# Patient Record
Sex: FEMALE | Race: WHITE | NOT HISPANIC OR LATINO
[De-identification: names, ages, dates, MRNs, and addresses within clinical notes are randomized per-mention and may not be internally consistent; named-entity substitution may affect disease eponyms.]

---

## 2022-10-12 ENCOUNTER — APPOINTMENT (OUTPATIENT)
Dept: ORTHOPEDIC SURGERY | Facility: CLINIC | Age: 87
End: 2022-10-12

## 2022-10-12 DIAGNOSIS — I10 ESSENTIAL (PRIMARY) HYPERTENSION: ICD-10-CM

## 2022-10-12 PROBLEM — Z00.00 ENCOUNTER FOR PREVENTIVE HEALTH EXAMINATION: Status: ACTIVE | Noted: 2022-10-12

## 2022-10-12 PROCEDURE — 99203 OFFICE O/P NEW LOW 30 MIN: CPT | Mod: 25

## 2022-10-12 PROCEDURE — 73560 X-RAY EXAM OF KNEE 1 OR 2: CPT | Mod: LT

## 2022-10-12 PROCEDURE — 20610 DRAIN/INJ JOINT/BURSA W/O US: CPT | Mod: LT

## 2022-10-12 NOTE — DISCUSSION/SUMMARY
[de-identified] : \par IMPRESSION:   Left knee pain due to osteoarthritis of the knee.\par \par \par PLAN:  Patient was advised for a cortisone injection for her acute pain, patient agrees.  \par Patient tolerated injection well.  \par Patient was advised to follow up in 4 weeks for repeat evaluation at that time we may discuss viscosupplementation injection if the cortisone injection did not help her.  \par Patient was also advised that cortisone injection can only be given 3 times a year.\par Patient was advised for the use of a knee support, this time she was placed in an Ace bandage.\par \par \par FOLLOW-UP:  Four weeks for repeat evaluation.\par \par \par \par SUPERVISING PHYSICIAN DR. BAILEY.\par

## 2022-10-12 NOTE — IMAGING
[de-identified] :  on examination of the left knee, patient has large knees due to body habitus, mild swelling, no ecchymosis, no erythema, no joint effusion observed.  \par No warmth to palpation.  \par Tenderness over the medial joint.  \par Mild crepitance with flexion-extension of the knee.  \par No signs of instability.  \par Patient is able to do active straight leg raise.  \par Calf is soft, nontender.  \par Patient has some vascular changes on the lower extremity.  \par \par \par X-ray of the left knee was done in office, two views were taking, this x-ray was negative for any acute fracture or dislocation, joint narrowing space over the medial and patellofemoral compartment. ENT

## 2022-10-12 NOTE — PROCEDURE
[Large Joint Injection] : Large joint injection [Left] : of the left [Knee] : knee [Pain] : pain [X-ray evidence of Osteoarthritis on this or prior visit] : x-ray evidence of Osteoarthritis on this or prior visit [Alcohol] : alcohol [Betadine] : betadine [Ethyl Chloride sprayed topically] : ethyl chloride sprayed topically [Sterile technique used] : sterile technique used [___ cc    0.25%] : Bupivacaine (Marcaine) ~Vcc of 0.25%  [___ cc    4mg] : Dexamethasone (Decadron) ~Vcc of 4 mg  [] : Patient tolerated procedure well

## 2022-10-12 NOTE — HISTORY OF PRESENT ILLNESS
[de-identified] :  87-year-old female here for an evaluation of injury sustained to the left knee, patient states that couple days ago she had numbness on her left lower extremity, her leg fell is the and she putting call weight on her left lower extremity so she was crawling for about an hour to get  around her apartment, she states that fine and she was able to standup, since then she is having significant pain her left knee, patient states that the pain is mainly with ambulation, patient states that she has been ambulating with a cane, prior to this incident she was walking with no cane.\par \par   Past medical history hypertension

## 2022-11-04 ENCOUNTER — APPOINTMENT (OUTPATIENT)
Dept: ORTHOPEDIC SURGERY | Facility: CLINIC | Age: 87
End: 2022-11-04

## 2022-11-29 ENCOUNTER — APPOINTMENT (OUTPATIENT)
Dept: ORTHOPEDIC SURGERY | Facility: CLINIC | Age: 87
End: 2022-11-29

## 2022-11-29 PROCEDURE — 99214 OFFICE O/P EST MOD 30 MIN: CPT

## 2022-11-29 RX ORDER — HYLAN G-F 20 16MG/2ML
48 SYRINGE (ML) INTRAARTICULAR
Qty: 1 | Refills: 0 | Status: ACTIVE | OUTPATIENT
Start: 2022-11-29

## 2022-11-29 NOTE — HISTORY OF PRESENT ILLNESS
[de-identified] : 87-year-old female status post injury sustained to the left knee on about giving October, patient fell down injured her knee, she was seen in our office on October 12 at that time she was advised of osteoarthritis and contusion, patient was advised to have a cortisone injection and use protective weight-bearing, patient states that injection did not last long.  \par X-ray of the left knee were done in previous visit and noted that she has moderate to severe arthritis\par \par  patient states that the pain is mainly when she get up from a sitting position.  \par Patient states that she responded well to ibuprofen.

## 2022-11-29 NOTE — DISCUSSION/SUMMARY
[de-identified] : IMPRESSION: Left knee osteoarthritis\par \par PLAN:Synvisc One to left knee\par \par FOLLOW-UP: 6 weeks\par \par SUPERVISING PHYSICIAN DR. BAILEY\par

## 2022-11-29 NOTE — IMAGING
[de-identified] :  on examination of the left knee, patient has large knees due to body habitus, mild swelling, no ecchymosis, no erythema, no joint effusion observed.  \par No warmth to palpation.  \par Tenderness over the medial joint.  \par Mild crepitance with flexion-extension of the knee.  \par No signs of instability.  \par Patient is able to do active straight leg raise.  \par Calf is soft, nontender.  \par Patient has some vascular changes on the lower extremity.  \par \par \par

## 2023-01-10 ENCOUNTER — APPOINTMENT (OUTPATIENT)
Dept: ORTHOPEDIC SURGERY | Facility: CLINIC | Age: 88
End: 2023-01-10
Payer: MEDICARE

## 2023-01-10 PROCEDURE — 20610 DRAIN/INJ JOINT/BURSA W/O US: CPT | Mod: LT

## 2023-01-10 NOTE — DISCUSSION/SUMMARY
[de-identified] : Impression: Left knee osteoarthritis\par \par Plan: Synvisc 1 injection was given to the left knee, patient tolerated well, patient was advised that this medication may take 6 weeks for this medication to works.\par Patient was advised to give me a call to give an update on how she is doing with this medication.\par Since the patient failed 2 cortisone injection, in case of she were to fail to viscosupplementation injection we can try Zilretta injection.\par \par Follow-up: 6 months to a year\par \par Supervising physician Dr. Charles

## 2023-01-10 NOTE — HISTORY OF PRESENT ILLNESS
[de-identified] : 87-year female here for Synvisc 1 injection to the left knee, patient states that the cortisone injection did not help her pain, and she is grateful that this medication will help her.\par Patient is also suffering with peripheral vascular disease.  Patient had a recent appointment with her vascular doctor.

## 2023-01-10 NOTE — IMAGING
[de-identified] :  on examination of the left knee, patient has large knees due to body habitus, mild swelling, no ecchymosis, no erythema, no joint effusion observed.  \par No warmth to palpation.  \par Tenderness over the medial joint.  \par Mild crepitance with flexion-extension of the knee.  \par No signs of instability.  \par Patient is able to do active straight leg raise.  \par Calf is soft, nontender.  \par Patient has some vascular changes on the lower extremity.  \par Patient has some blanching erythema over the anterior aspect of the leg, nontender to palpation, no signs of infection\par \par \par

## 2023-06-16 ENCOUNTER — APPOINTMENT (OUTPATIENT)
Dept: ORTHOPEDIC SURGERY | Facility: CLINIC | Age: 88
End: 2023-06-16
Payer: MEDICARE

## 2023-06-16 ENCOUNTER — APPOINTMENT (OUTPATIENT)
Dept: ORTHOPEDIC SURGERY | Facility: CLINIC | Age: 88
End: 2023-06-16

## 2023-06-16 PROCEDURE — 99213 OFFICE O/P EST LOW 20 MIN: CPT

## 2023-06-16 NOTE — HISTORY OF PRESENT ILLNESS
[de-identified] : 87-year-old lady with left knee pain.\par \par No improvement with cortisone or viscosupplementation injections\par \par Considering surgery but at this point I do not think she would be a good candidate for the operation.  She is somewhat debilitated, she has pedal edema and peripheral vascular disease in both legs.\par And that she would have a difficult time with the rehab and be high risk for the operation.\par \par X-rays are reviewed, she had moderate arthritic changes in the left knee\par \par She may be a better candidate for pain management, nonoperative management of her symptoms with possible nerve ablation.\par \par She can follow-up with me as needed.\par \par

## 2023-06-29 ENCOUNTER — APPOINTMENT (OUTPATIENT)
Dept: PAIN MANAGEMENT | Facility: CLINIC | Age: 88
End: 2023-06-29
Payer: MEDICARE

## 2023-06-29 VITALS — WEIGHT: 170 LBS | HEIGHT: 64 IN | BODY MASS INDEX: 29.02 KG/M2

## 2023-06-29 DIAGNOSIS — Z87.39 PERSONAL HISTORY OF OTHER DISEASES OF THE MUSCULOSKELETAL SYSTEM AND CONNECTIVE TISSUE: ICD-10-CM

## 2023-06-29 DIAGNOSIS — Z86.79 PERSONAL HISTORY OF OTHER DISEASES OF THE CIRCULATORY SYSTEM: ICD-10-CM

## 2023-06-29 PROCEDURE — 99203 OFFICE O/P NEW LOW 30 MIN: CPT

## 2023-06-30 NOTE — DATA REVIEWED
[FreeTextEntry1] : X-ray of the left knee reviewed \par Mild-to-moderate osteoarthritis at the medial joint compartment

## 2023-06-30 NOTE — HISTORY OF PRESENT ILLNESS
[FreeTextEntry1] : HISTORY OF PRESENT ILLNESS: Mrs. Abernathy is an 87 year old female complaining of knee pain. The pain started one year ago after an undermine event. The patients left knee pain; the pain is located medial joint. The patient admits of knees buckle. The pain is worse when walking and needs to use cane for support. She reports 6-7 months ago she had a fall. Patient describes the pain as moderate to severe.  During the last month the pain has been intermittent with symptoms worsening in no typical pattern. Pain described as sharp. Pain is increased with standing and walking.  Pain is decreased with lying down, sitting. Bowel or bladder habits have not changed.\par \par ACTIVITIES: Patient can sit 10 hours before pain starts.  Patient can stand 15 minutes before pain starts.  The patient never lies down because of pain.  Patient uses cane at this time.  Patient has difficulty performing household chores, going to work, doing yard work or shopping, participating in recreational activities or exercise at this time.\par Prior Pain Medications: liquid Motrin \par

## 2023-06-30 NOTE — PHYSICAL EXAM
[de-identified] : Inspection/palpation\par Negative swelling, erythema, warmth\par minimal tenderness to palpation at medial joint line \par Negative crepitus\par \par ROM:\par Flexion 115 (normal 120-150)\par Extension 0 (normal 0-15)\par \par Tests: Negative anterior drawer test\par Negative Prasad's test\par There is no varus or valgus instability\par Quad strength is 5/5, hamstring strength is 5/5\par

## 2023-06-30 NOTE — DISCUSSION/SUMMARY
[de-identified] : A discussion regarding available pain management treatment options occurred with the patient.  These included interventional, rehabilitative, pharmacological, and alternative modalities. We will proceed with the following:\par \par Interventional treatment options:\par - Proceed with left genicular nerve block given a poor response to intra-articular steroid and hyaluronic acid injections.  The pain is still persistent and has failed conservative management otherwise.\par \par Rehabilitative options:\par - participation in active HEP was discussed\par \par follow up \par \par ANNMARIE Nguyen attest that this documentation has been prepared under the direction and in the presence of provider Dr. Sebas Nguyen. \par The documentation recorded by the scribe in my presence, accurately reflects the service I personally performed, and the decisions made by me with my edits as appropriate. \par \par Sebas Nguyen, \par \par

## 2023-07-17 ENCOUNTER — APPOINTMENT (OUTPATIENT)
Dept: PAIN MANAGEMENT | Facility: CLINIC | Age: 88
End: 2023-07-17
Payer: MEDICARE

## 2023-07-17 PROCEDURE — 64454 NJX AA&/STRD GNCLR NRV BRNCH: CPT | Mod: LT

## 2023-07-18 NOTE — PROCEDURE
[FreeTextEntry3] : Date of Service: 07/17/2023 \par \par Account: [id number]\par \par Patient: HETAL GOFF \par \par YOB: 1935\par \par Age: 87 year\par \par Surgeon:  Sebas Nguyen DO\par \par Assistant: None\par \par Pre-Operative Diagnosis: \par 1) Chronic Left knee pain\par \par Post Operative Diagnosis:\par 1) Chronic Left knee pain\par \par Procedure:\par 1) Left superior medial genicular nerve block with fluoroscopic guidance\par 2) Left superior lateral genicular nerve block with fluoroscopic guidance\par 3) Left inferomedial genicular nerve block with fluoroscopic guidance\par  \par Anesthesia:  none\par \par This procedure was carried out using fluoroscopic guidance.  The risks and benefits of the procedure were discussed extensively with the patient.  The consent of the patient was obtained and the following procedure was performed.  The patient was placed in the supine position on the fluoroscopy table with the right knee extended straight and right knee flexed to allow for fluoroscopic lateral view of the left knee. A timeout was performed with all essential staff present and the site and side were verified.\par \par Local anesthesia consisting of Lidocaine 1% was injected subcutaneously with a 25 gauge needle at each of the nerve block needle entry sites.  Then a 25g 3.5 spinal needle was advanced to the medial flare of the femur metaphysis/diaphysis junction on the left side. A second 25g 3.5 spinal needle was advanced to the lateral flare of the metaphysis/diaphysis junction on the left knee. A third 25g 3.5 spinal needle was advanced to the medial flare of the tibial metaphysis/diaphysis junction of the left knee. The needle position was confirmed in A/P and lateral fluoroscopic views to be at the midway point of the AP diameter of the long bone femur / tibia. 1mL of a mixture of 0.25% bupivicaine and 10mg decadron was injected at each of the three sites.\par \par The needles were subsequently removed.  Vital signs remained normal.  Pulse oximeter was used throughout the procedure and the patient's pulse and oxygen saturation remained within normal limits.  The patient tolerated the procedure well.  There were no complications.  The patient was instructed to apply ice over the injection sites for twenty minutes every two hours for the next 24 to 48 hours.\par \par Disposition:\par \par 1. The patient was advised to F/U in 1-2 weeks to assess the response to the injection.\par 2. The patient was also instructed to contact me immediately if there were any concerns related to the procedure performed.

## 2023-08-24 ENCOUNTER — APPOINTMENT (OUTPATIENT)
Dept: PAIN MANAGEMENT | Facility: CLINIC | Age: 88
End: 2023-08-24
Payer: MEDICARE

## 2023-08-24 VITALS — HEIGHT: 64 IN | WEIGHT: 170 LBS | BODY MASS INDEX: 29.02 KG/M2

## 2023-08-24 PROCEDURE — 99213 OFFICE O/P EST LOW 20 MIN: CPT

## 2023-08-24 RX ORDER — LIDOCAINE 5% 700 MG/1
5 PATCH TOPICAL
Qty: 30 | Refills: 0 | Status: ACTIVE | COMMUNITY
Start: 2023-07-13 | End: 1900-01-01

## 2023-08-28 NOTE — HISTORY OF PRESENT ILLNESS
[FreeTextEntry1] : HISTORY OF PRESENT ILLNESS: Mrs. Abernathy is an 87 year old female complaining of knee pain. The pain started one year ago after an undermine event. The patients left knee pain; the pain is located medial joint. The patient admits of knees buckle. The pain is worse when walking and needs to use cane for support. She reports 6-7 months ago she had a fall. Patient describes the pain as moderate to severe.  During the last month the pain has been intermittent with symptoms worsening in no typical pattern. Pain described as sharp. Pain is increased with standing and walking.  Pain is decreased with lying down, sitting. Bowel or bladder habits have not changed.  ACTIVITIES: Patient can sit 10 hours before pain starts.  Patient can stand 15 minutes before pain starts.  The patient never lies down because of pain.  Patient uses cane at this time.  Patient has difficulty performing household chores, going to work, doing yard work or shopping, participating in recreational activities or exercise at this time. Prior Pain Medications: liquid Motrin   8/24/23 86 y/o f is s/p L Knee genicular nerve block performed on 7/17/23 with 80% relief for 2 days.   Patient states that she has had some sustained benefit from the injection helping with the sharp/burning sensations in the left knee. The pain is at its worst when walking, sitting relieves the pain. Patients continue to take liquid Motrin to manage pain. Today she rates her pain a 5/10 on the pain scale. Patient denies any bowel or bladder dysfunction, incontinence, or saddle anesthesia.

## 2023-08-28 NOTE — DISCUSSION/SUMMARY
[de-identified] : A discussion regarding available pain management treatment options occurred with the patient.  These included interventional, rehabilitative, pharmacological, and alternative modalities. We will proceed with the following:  Interventional treatment options: Patient will proceed with second left genicular nerve block w/o MAC Ms. Abernathy presents with Left Knee osteoarthritis that has not responded to 3 months of conservative therapy including physical therapy / NSAID therapy. The patient had already one positive response with at least 80% relief after the Left Knee Genicular nerve block the pain is interfering with activities of daily living and functionality. There is no unexplained neurologic deficit.  There is no history of systemic infection, unstable medical condition, bleeding tendency, or local infection.  Medication based treatment options: -Lidocaine 5% Patch was prescribed APPLY 1 PATCH TO THE AFFECTED AREA AND LEAVE IN PLACE FOR 12 HOURS, THEN REMOVE AND LEAVE OFF FOR 12 HOURS  Additional treatment recommendations as follows: - patient will f/u 2 weeks after injection.   I Alla Tapia attest that this documentation has been prepared under the direction and in the presence of provider Dr. Sebas Nguyen.  The documentation recorded by the scribe in my presence, accurately reflects the service I personally performed, and the decisions made by me with my edits as appropriate.   Sebas Nguyen, DO

## 2023-08-28 NOTE — PHYSICAL EXAM
[de-identified] : Inspection/palpation Negative swelling, erythema, warmth Tenderness to palpation at medial joint line  Negative crepitus  ROM: Flexion 115 (normal 120-150) Extension 0 (normal 0-15)  Tests: Negative anterior drawer test Negative Prasad's test There is no varus or valgus instability Quad strength is 5/5, hamstring strength is 5/5

## 2023-09-18 ENCOUNTER — APPOINTMENT (OUTPATIENT)
Dept: PAIN MANAGEMENT | Facility: CLINIC | Age: 88
End: 2023-09-18
Payer: MEDICARE

## 2023-09-18 PROCEDURE — 64454 NJX AA&/STRD GNCLR NRV BRNCH: CPT | Mod: LT

## 2023-10-05 ENCOUNTER — APPOINTMENT (OUTPATIENT)
Dept: PAIN MANAGEMENT | Facility: CLINIC | Age: 88
End: 2023-10-05
Payer: MEDICARE

## 2023-10-05 DIAGNOSIS — M17.12 UNILATERAL PRIMARY OSTEOARTHRITIS, LEFT KNEE: ICD-10-CM

## 2023-10-05 PROCEDURE — 99214 OFFICE O/P EST MOD 30 MIN: CPT

## 2023-10-09 PROBLEM — M17.12 PRIMARY OSTEOARTHRITIS OF LEFT KNEE: Status: ACTIVE | Noted: 2022-10-12

## 2023-12-07 ENCOUNTER — APPOINTMENT (OUTPATIENT)
Dept: PAIN MANAGEMENT | Facility: CLINIC | Age: 88
End: 2023-12-07

## 2024-02-20 ENCOUNTER — APPOINTMENT (OUTPATIENT)
Dept: PAIN MANAGEMENT | Facility: CLINIC | Age: 89
End: 2024-02-20